# Patient Record
Sex: MALE | Race: ASIAN | NOT HISPANIC OR LATINO | URBAN - METROPOLITAN AREA
[De-identification: names, ages, dates, MRNs, and addresses within clinical notes are randomized per-mention and may not be internally consistent; named-entity substitution may affect disease eponyms.]

---
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
## 2022-05-14 ENCOUNTER — EMERGENCY (EMERGENCY)
Facility: HOSPITAL | Age: 22
LOS: 1 days | Discharge: HOME | End: 2022-05-14
Attending: EMERGENCY MEDICINE
Payer: MEDICAID

## 2022-05-14 VITALS
DIASTOLIC BLOOD PRESSURE: 79 MMHG | RESPIRATION RATE: 17 BRPM | HEIGHT: 71.26 IN | OXYGEN SATURATION: 98 % | TEMPERATURE: 97 F | SYSTOLIC BLOOD PRESSURE: 128 MMHG | WEIGHT: 157.63 LBS | HEART RATE: 84 BPM

## 2022-05-14 PROCEDURE — 99284 EMERGENCY DEPT VISIT MOD MDM: CPT

## 2022-05-14 PROCEDURE — 96372 THER/PROPH/DIAG INJ SC/IM: CPT

## 2022-05-14 PROCEDURE — 99283 EMERGENCY DEPT VISIT LOW MDM: CPT | Mod: 25

## 2022-05-14 RX ORDER — DIPHENHYDRAMINE HYDROCHLORIDE AND LIDOCAINE HYDROCHLORIDE AND ALUMINUM HYDROXIDE AND MAGNESIUM HYDRO
5 KIT
Qty: 60 | Refills: 0
Start: 2022-05-14

## 2022-05-14 RX ORDER — DIPHENHYDRAMINE HCL 50 MG
5 CAPSULE ORAL ONCE
Refills: 0 | Status: DISCONTINUED | OUTPATIENT
Start: 2022-05-14 | End: 2022-05-14

## 2022-05-14 RX ORDER — LIDOCAINE 4 G/100G
5 CREAM TOPICAL ONCE
Refills: 0 | Status: COMPLETED | OUTPATIENT
Start: 2022-05-14 | End: 2022-05-14

## 2022-05-14 RX ORDER — DEXAMETHASONE 0.5 MG/5ML
5 ELIXIR ORAL ONCE
Refills: 0 | Status: COMPLETED | OUTPATIENT
Start: 2022-05-14 | End: 2022-05-14

## 2022-05-14 RX ADMIN — LIDOCAINE 5 MILLILITER(S): 4 CREAM TOPICAL at 21:07

## 2022-05-14 RX ADMIN — Medication 5 MILLIGRAM(S): at 20:50

## 2022-05-14 RX ADMIN — Medication 5 MILLILITER(S): at 21:07

## 2022-05-14 NOTE — ED PROVIDER NOTE - OBJECTIVE STATEMENT
22 yo covid x 1 week with sore throat and swelling and erythema. 20 yo covid x 1 week with sore throat and sore throat and pain with swollowing

## 2022-05-14 NOTE — ED PROVIDER NOTE - PATIENT PORTAL LINK FT
You can access the FollowMyHealth Patient Portal offered by [facility] by registering at the following website: [email or web address]. By joining [employer]’s FollowMyHealth portal, you will also be able to view your health information using other applications (apps) compatible with our system.

## 2022-05-14 NOTE — ED PROVIDER NOTE - PHYSICAL EXAMINATION
Stanley:   Vitals normal   nodistress  Awake Alert oriented to person, place, situation,   Normocephalic, atraumatic, neck supple   No LE swelling    No rash  Neuro exam grossly intact, no weakness, numbness, Stanley:   Vitals normal   nodistress  Awake Alert oriented to person, place, situation,   no exudate   Normocephalic, atraumatic, neck supple   No LE swelling    No rash  Neuro exam grossly intact, no weakness, numbness, Epidermal Closure Graft Donor Site (Optional): simple interrupted

## 2022-05-14 NOTE — ED PROVIDER NOTE - NSFOLLOWUPINSTRUCTIONS_ED_ALL_ED_FT
COVID-19 (Coronavirus Disease 2019)    WHAT YOU NEED TO KNOW:    What do I need to know about COVID-19? COVID-19 is the disease caused by a coronavirus first discovered in December 2019. Coronaviruses generally cause upper respiratory (nose, throat, and lung) infections, such as a cold. The 2019 virus spreads quickly and easily. It can be spread starting 2 to 3 days before symptoms even begin.    What do I need to know about variants? The virus has changed into several new forms (called variants) since it was discovered. The variants may be more contagious (easily spread) than the original form. Some may also cause more severe illness than others.    What are the signs and symptoms of COVID-19? You may not develop any signs or symptoms. Signs and symptoms usually start about 5 days after infection but can take 2 to 14 days. You may feel like you have the flu or a bad cold. Some signs and symptoms go away in a few days. Others can last weeks, months, or possibly years. You may have any of the following:  •A cough      •Shortness of breath or trouble breathing that may become severe      •A fever      •Chills that might include shaking      •Muscle pain, body aches, or a headache      •A sore throat      •Sudden changes or loss of your taste or smell      •Feeling mentally and physically tired (fatigue)      •Congestion (stuffy head and nose), or a runny nose      •Diarrhea, nausea, or vomiting      How is COVID-19 diagnosed? Your healthcare provider will examine you and ask about your symptoms. Tell him or her if you have any health conditions or are taking any medicines. Any of the following tests may be used:  •A viral test shows if you have a current infection. Some tests are available for you to do at home. Most use a nasal swab and give the result within 15 minutes. These tests are available at many pharmacy stores. Talk to your provider or pharmacist if you have any questions about this type of test. Testing sites are also available. A sample is taken from your nose or throat with a swab. You may need to quarantine until you get the results from a testing site.      •An antigen test shows if you have a protein from the COVID-19 virus. This test is often called a rapid test because the results can be available in 30 minutes or less.      •An antibody test shows if you had a recent or past infection. Blood samples are used for this test. Antibodies are made by your immune system to fight the virus that causes COVID-19. Antibodies form 1 to 3 weeks after you are infected. This test is not used to show if you are immune to the virus.      •A CT, MRI, ultrasound, or x-ray may be used to check for complications of COVID-19. These may include pneumonia, blood clots, or other complications.      How is COVID-19 treated?   •Mild symptoms may get better on their own. Some treatments have emergency use authorization (EUA). Examples include monoclonal antibodies and convalescent plasma. These may be given to help prevent worsening of your symptoms. You may also need any of the following:?Decongestants help reduce nasal congestion and help you breathe more easily. If you take decongestant pills, they may make you feel restless or cause problems with your sleep. Do not use decongestant sprays for more than a few days.      ?Cough suppressants help reduce coughing. Ask your healthcare provider which type of cough medicine is best for you.      ?To soothe a sore throat, gargle with warm salt water, or use throat lozenges or a throat spray. Drink more liquids to thin and loosen mucus and to prevent dehydration.      ?NSAIDs or acetaminophen can help lower a fever and relieve body aches or a headache. Follow directions. If not taken correctly, NSAIDs can cause kidney damage and acetaminophen can cause liver damage.      •Severe or life-threatening symptoms are treated in the hospital. You may need any of the following: ?Medicines may be given to fight the virus or treat inflammation.      ?Blood thinners help prevent or treat blood clots. If you have a deep vein thrombosis (DVT) or pulmonary embolism (PE), you may need to use blood thinners for at least 3 months.      ?Extra oxygen may be given if you have respiratory failure. This means your lungs cannot get enough oxygen into your blood and out to your organs.      ?A ventilator may be used to help you breathe.        What do I need to know about health problems the virus may cause? You may develop long-term health problems caused by the virus. Your risk is higher if you are 65 or older. A weak immune system, obesity, diabetes, chronic kidney disease, or a heart or lung condition can also increase your risk. Your risk is also higher if you are a current or former cigarette smoker. COVID-19 can lead to any of the following:  •Multisymptom inflammatory syndrome in adults (MIS-A) or in children (MIS-C), causing inflammation in the heart, digestive system, skin, or brain      •Shortness of breath, serious lower respiratory conditions, such as pneumonia or acute respiratory distress syndrome (ARDS)      •Blood clots or blood vessel damage      •Organ damage from a lack of oxygen or from blood clots      •Sleep problems      •Problems thinking clearly, remembering information, or concentrating      •Mood changes, depression, or anxiety      •Long-term problems tasting or smelling      •Loss of appetite and weight loss      •Nerve pain      •Fatigue (feeling mentally and physically tired)      How does the 2019 coronavirus spread?   •Droplets are the main way all coronaviruses spread. The virus travels in droplets that form when a person talks, sings, coughs, or sneezes. The droplets can also float in the air for minutes or hours. Infection happens when you breathe in the droplets or get them in your eyes or nose. Close personal contact with an infected person increases your risk for infection. This means being within 6 feet (2 meters) of the person for at least 15 minutes over 24 hours.      •Person-to-person contact can spread the virus. For example, a person with the virus on his or her hands can spread it by shaking hands with someone.      •The virus can stay on objects and surfaces for up to 3 days. You may become infected by touching the object or surface and then touching your eyes or mouth.      What do I need to know about COVID-19 vaccines? Healthcare providers recommend a COVID-19 vaccine, even if you have already had COVID-19. You are considered fully vaccinated against COVID-19 two weeks after the final dose of any COVID-19 vaccine. Let your healthcare provider know when you have received the final dose of the vaccine. Make a copy of your vaccination card. Keep the original with you in case you need to show it. Keep the copy in a safe place.  •COVID-19 vaccines are given as a shot in 1 or 2 doses.   Vaccination is recommended for everyone 5 years or older.?One 2-dose vaccine is fully approved for those 16 years or older. This vaccine also has an emergency use authorization (EUA) for children 5 to 15 years old. An EUA means the vaccine is not officially approved but is used because the benefits outweigh the risks. No vaccine is currently available for children younger than 5 years.      ?One 2-dose vaccine is fully approved for adults 18 years or older. This vaccine is not currently given to children 17 years or younger.      ?A booster (additional) dose helps the immune system continue to protect against severe COVID-19.?A booster is recommended for all adults 18 or older. The booster can be a different brand of the COVID-19 vaccine than you originally received. The timing for the booster depends on the type of vaccine you received:?1-dose vaccine: The booster is given at least 2 months after you received the vaccine.      ?2-dose vaccine: The booster is given at least 5 or 6 months after the second dose. A second booster is recommended for all adults 50 or older and for immunocompromised adults 18 or older. Your healthcare provider will tell you when to get this booster.      ?A booster can be given to adolescents 12 to 17 years old. Only 1 COVID-19 vaccine has this EUA. The booster is given at least 5 months after the second dose of the original vaccine series. A second booster is recommended for immunocompromised adolescents. Your adolescent's healthcare provider will tell you when this booster should be given.      ?A booster is recommended for immunocompromised children 5 to 11 years old. Only 1 COVID-19 vaccine has this EUA. The booster is given 28 days after the second dose.      COVID-19 Immunization Schedule         •Continue social distancing and other measures. You can become infected even after you get the vaccine. You may also be able to pass the virus to others without knowing you are infected.      •After you get the vaccine, check local, national, and international travel rules. You may need to be tested before you travel. Some countries require proof of a negative test before you travel. You may also need to quarantine after you return.      •Medicine may be given to prevent infection. The medicine can be given if you are at high risk for infection and cannot get the vaccine. It can also be given if your immune system does not respond well to the vaccine.      How can I help lower the risk for COVID-19?   •Wash your hands often throughout the day. Use soap and water. Rub your soapy hands together, lacing your fingers, for at least 20 seconds. Rinse with warm, running water. Dry your hands with a clean towel or paper towel. Use hand  that contains alcohol if soap and water are not available. Teach children how to wash their hands and use hand .  Handwashing           •Cover sneezes and coughs. Turn your face away and cover your mouth and nose with a tissue. Throw the tissue away. Use the bend of your arm if a tissue is not available. Then wash your hands well with soap and water or use hand . Teach children how to cover a cough or sneeze.      •Wear a face covering (mask) when needed. Use a cloth covering with at least 2 layers. You can also create layers by putting a cloth covering over a disposable non-medical mask. Cover your mouth and your nose.  How to Wear a Face Covering (Mask)           •Follow worldwide, national, and local social distancing guidelines. Keep at least 6 feet (2 meters) between you and others.      •Try not to touch your face. If you get the virus on your hands, you can transfer it to your eyes, nose, or mouth and become infected. You can also transfer it to objects, surfaces, or people.      •Clean and disinfect high-touch surfaces and objects often. Use disinfecting wipes, or make a solution of 4 teaspoons of bleach in 1 quart (4 cups) of water.      •Ask about other vaccines you may need. Get the influenza (flu) vaccine as soon as recommended each year, usually starting in September or October. Get the pneumonia vaccine if recommended. Your healthcare provider can tell you if you should also get other vaccines, and when to get them.    Prevent COVID-19 Infection         How do I follow social distancing guidelines to help lower the risk for COVID-19? National and local social distancing rules vary. Rules and restrictions may change over time as restrictions are lifted. The following are general guidelines:  •Stay home if you are sick or think you may have COVID-19. It is important to stay home if you are waiting for a testing appointment or for test results.      •Avoid close physical contact with anyone who does not live in your home. Do not shake hands with, hug, or kiss a person as a greeting. If you must use public transportation (such as a bus or subway), try to sit or stand away from others. Wear your face covering.      •Avoid in-person gatherings and crowds. Attend virtually if possible.      Where can I find more information?   •Centers for Disease Control and Prevention  [address] [place] [address]  Phone: 1-508.824.8043  Web Address: http://www.cdc.gov        Call your local emergency number (911 in the [place]) if:   •You have trouble breathing or shortness of breath at rest.      •You have chest pain or pressure that lasts longer than 5 minutes.      •You become confused or hard to wake.      •Your lips or face are blue.      When should I seek immediate care?   •You have a fever of 104°F (40°C) or higher.          When should I call my doctor?   •You have symptoms of COVID-19.      •You have questions or concerns about your condition or care.      CARE AGREEMENT:    You have the right to help plan your care. Learn about your health condition and how it may be treated. Discuss treatment options with your healthcare providers to decide what care you want to receive. You always have the right to refuse treatment.

## 2024-08-13 NOTE — ED ADULT TRIAGE NOTE - BSA (M2)
Subjective   Patient ID: Juanjo Alexis is a 70 y.o. male who presents for cough  Telephone visit    A telephone visit (audio only) between the patient (at the originating site) and the provider (at the distant site) was utilized to provide this telehealth service.   Verbal consent was requested and obtained from Juanjo Alexis on this date, 08/13/24 for a telehealth visit.     Spent 5 minutes on medical discussion    Patient has been having cough and wheezing x 2-3 weeks, has been using albuterol more often, no fever. No nausea/vomiting/diarrhea.   Needs refill of his ketoconazole has jock itch again.    Review of Systems  General: no fever  Eyes: no blurry vision  ENT: no sore throat, no ear pain  Resp: see HPI  Cardio: no chest pain, no palpitations  Abd: no nausea/vomiting  : no dysuria, no increased urinary frequency    Vitals:   due to telehealth visit, vitals not obtained, patient did not have them available at the time of the visit       Objective   Physical Exam  physical exam could not be performed due to telephone visit    Assessment/Plan   Problem List Items Addressed This Visit    None  Visit Diagnoses       COPD exacerbation (Multi)    -  Primary    Relevant Medications    doxycycline (Monodox) 100 mg capsule    predniSONE (Deltasone) 20 mg tablet    Tinea cruris        Relevant Medications    ketoconazole (NIZOral) 2 % cream                1.91